# Patient Record
Sex: FEMALE | Race: WHITE | NOT HISPANIC OR LATINO | URBAN - METROPOLITAN AREA
[De-identification: names, ages, dates, MRNs, and addresses within clinical notes are randomized per-mention and may not be internally consistent; named-entity substitution may affect disease eponyms.]

---

## 2018-06-05 ENCOUNTER — OFFICE VISIT (OUTPATIENT)
Dept: OPHTHALMOLOGY | Facility: CLINIC | Age: 68
End: 2018-06-05
Payer: COMMERCIAL

## 2018-06-05 DIAGNOSIS — Z98.890 S/P LASIK (LASER ASSISTED IN SITU KERATOMILEUSIS) OF BOTH EYES: ICD-10-CM

## 2018-06-05 DIAGNOSIS — H52.4 PRESBYOPIA: ICD-10-CM

## 2018-06-05 DIAGNOSIS — H52.203 HYPEROPIC ASTIGMATISM OF BOTH EYES: ICD-10-CM

## 2018-06-05 DIAGNOSIS — H35.3130 AGE-RELATED MACULAR DEGENERATION, DRY, BOTH EYES: Primary | ICD-10-CM

## 2018-06-05 RX ORDER — BUPROPION HYDROCHLORIDE 150 MG/1
150 TABLET, EXTENDED RELEASE ORAL DAILY
COMMUNITY

## 2018-06-05 ASSESSMENT — REFRACTION_MANIFEST
OD_AXIS: 179
OS_AXIS: 005
OD_CYLINDER: +1.25
OD_SPHERE: +0.50
OS_SPHERE: +0.50
OS_CYLINDER: +1.50
OD_ADD: +2.50
OS_ADD: +2.50

## 2018-06-05 ASSESSMENT — CONF VISUAL FIELD
METHOD: COUNTING FINGERS
OD_NORMAL: 1
OS_NORMAL: 1

## 2018-06-05 ASSESSMENT — REFRACTION_WEARINGRX
SPECS_TYPE: PAL
OD_ADD: +2.50
OD_CYLINDER: +1.25
OS_ADD: +2.50
OD_AXIS: 160
OD_SPHERE: +0.50
OS_CYLINDER: +1.25
OS_AXIS: 010
OS_SPHERE: +0.50

## 2018-06-05 ASSESSMENT — VISUAL ACUITY
OD_CC+: +2
OD_CC: J1+
OD_CC: 20/20
OS_CC: 20/20
OS_CC: J1+
METHOD: SNELLEN - LINEAR
CORRECTION_TYPE: GLASSES
OS_CC+: -2

## 2018-06-05 ASSESSMENT — CUP TO DISC RATIO
OS_RATIO: 0.3
OD_RATIO: 0.3

## 2018-06-05 ASSESSMENT — TONOMETRY
OS_IOP_MMHG: 14
IOP_METHOD: TONOPEN
OD_IOP_MMHG: 14

## 2018-06-05 ASSESSMENT — EXTERNAL EXAM - LEFT EYE: OS_EXAM: NORMAL

## 2018-06-05 ASSESSMENT — EXTERNAL EXAM - RIGHT EYE: OD_EXAM: NORMAL

## 2018-06-05 NOTE — MR AVS SNAPSHOT
After Visit Summary   2018    Makenna Ching    MRN: [id number]           Patient Information     Date Of Birth          1950        Visit Information        Provider Department      2018 2:30 PM Reema Biggs MD [facility]        Today's Diagnoses     Age-related macular degeneration, dry, both eyes    -  1    S/P LASIK (laser assisted in situ keratomileusis) of both eyes        Hyperopic astigmatism of both eyes        Presbyopia           Follow-ups after your visit        Follow-up notes from your care team     Return in about 2 years (around 2020).      Who to contact     Please call your clinic at [phone] to:    Ask questions about your health    Make or cancel appointments    Discuss your medicines    Learn about your test results    Speak to your doctor            Additional Information About Your Visit        MyChart Information     The Auto Vault is an electronic gateway that provides easy, online access to your medical records. With The Auto Vault, you can request a clinic appointment, read your test results, renew a prescription or communicate with your care team.     To sign up for [employer] visit the website at [email or web address]   You will be asked to enter the access code listed below, as well as some personal information. Please follow the directions to create your username and password.     Your access code is: [email or web address]  Expires: 2018  6:31 AM     Your access code will  in 90 days. If you need help or a new code, please contact your [facility] or call [phone] for assistance.        Care EveryWhere ID     This is your Care EveryWhere ID. This could be used by other organizations to access your [facility] medical records  [id number]         Blood Pressure from Last 3 Encounters:   No data found for BP    Weight from Last 3 Encounters:   No data found for Wt              Today, you had the  following     No orders found for display         Today's Medication Changes          These changes are accurate as of 6/5/18  3:32 PM.  If you have any questions, ask your nurse or doctor.               Stop taking these medicines if you haven't already. Please contact your care team if you have questions.     multivitamin  with lutein Caps per capsule   Stopped by:  Reema Biggs MD                    Primary Care Provider Office Phone # Fax #    Fariha Edwards [phone]       [facility] [address]  [facility] [place] [address]        Equal Access to Services     [facility]: Hadii aad ku hadasho Soomaali, waaxda luqadaha, qaybta kaalmada adeegyahoang, rubén davis . So [facility] [phone].    ATENCIÓN: Si habla español, tiene a ayon disposición servicios gratuitos de asistencia lingüística. [facility] [phone].    We comply with applicable federal civil rights laws and [place] laws. We do not discriminate on the basis of race, color, national origin, age, disability, sex, sexual orientation, or gender identity.            Thank you!     Thank you for choosing [facility]  for your care. Our goal is always to provide you with excellent care. Hearing back from our patients is one way we can continue to improve our services. Please take a few minutes to complete the written survey that you may receive in the mail after your visit with us. Thank you!             Your Updated Medication List - Protect others around you: Learn how to safely use, store and throw away your medicines at www.disposemymeds.org.          This list is accurate as of 6/5/18  3:32 PM.  Always use your most recent med list.                   Brand Name Dispense Instructions for use Diagnosis    buPROPion 150 MG 12 hr tablet    WELLBUTRIN SR     Take 150 mg by mouth daily        esterified estrogens 0.3 MG Tabs tablet    MENEST     Take 0.3 mg by mouth every  other day        LEVOTHYROXINE SODIUM PO           PRILOSEC PO      Take 1 tablet by mouth daily

## 2018-06-05 NOTE — PROGRESS NOTES
HPI  Makenna Ching is a 67 year old female here for full eye exam. She feels her vision is good and stable in both eyes. She is tired of putting glasses on to read, so would like to try a bifocal to wear all the time. No eye pain, redness, discharge. No flashes/floaters. No metamorphopsia    Assessment & Plan    (H35.8243) Age-related macular degeneration, dry, both eyes  (primary encounter diagnosis)  Comment: No heme or SRF  Plan: Continue diet with green leafy vegetables and multivitamin    (Z98.890) S/P LASIK (laser assisted in situ keratomileusis) of both eyes  Comment: Around 2011 by Dr. Singleton. Good appearance  Plan: Observe    (H52.213) Hyperopic astigmatism of both eyes  (H52.4) Presbyopia  Comment: Good vision with refraction  Plan: Given updated glasses Rx     -----------------------------------------------------------------------------------    Patient disposition:   Return in about 2 years (around 6/5/2020). or sooner as needed.    Teaching statement:  Complete documentation of historical and exam elements from today's encounter can be found in the full encounter summary report (not reduplicated in this progress note). I personally obtained the chief complaint(s) and history of present illness.  I confirmed and edited as necessary the review of systems, past medical/surgical history, family history, social history, and examination findings as documented by others; and I examined the patient myself. I personally reviewed the relevant tests, images, and reports as documented above.     I formulated and edited as necessary the assessment and plan and discussed the findings and management plan with the patient and family.      Reema Biggs MD  Comprehensive Ophthalmology & Ocular Pathology  Department of Ophthalmology and Visual Neurosciences  [email or web address]  Pager [phone]

## 2019-05-01 ENCOUNTER — TELEPHONE (OUTPATIENT)
Dept: OPHTHALMOLOGY | Facility: CLINIC | Age: 69
End: 2019-05-01

## 2019-05-01 NOTE — TELEPHONE ENCOUNTER
[facility] Call Center    Phone Message    May a detailed message be left on voicemail: yes    Reason for Call: Other: Pt requesting a new prescription be mailed to her, but requesting the 2016 prescription not 2018. Per Pt there was just a slight change from 2016 to 2018, but the 2016 worked better for her. She needs a new one that is not  to get her glasses. Please mail to:    [address]  [address] [place] [id number]    Any questions, please give Pt a call back.    Action Taken: Message routed to:  [facility]